# Patient Record
Sex: FEMALE | Race: BLACK OR AFRICAN AMERICAN | NOT HISPANIC OR LATINO
[De-identification: names, ages, dates, MRNs, and addresses within clinical notes are randomized per-mention and may not be internally consistent; named-entity substitution may affect disease eponyms.]

---

## 2019-06-07 ENCOUNTER — HOSPITAL (OUTPATIENT)
Dept: OTHER | Age: 24
End: 2019-06-07

## 2019-06-07 PROCEDURE — 99283 EMERGENCY DEPT VISIT LOW MDM: CPT | Performed by: EMERGENCY MEDICINE

## 2019-10-30 ENCOUNTER — OFFICE VISIT (OUTPATIENT)
Dept: INTERNAL MEDICINE CLINIC | Facility: CLINIC | Age: 24
End: 2019-10-30
Payer: MEDICAID

## 2019-10-30 VITALS
HEART RATE: 78 BPM | RESPIRATION RATE: 17 BRPM | DIASTOLIC BLOOD PRESSURE: 72 MMHG | OXYGEN SATURATION: 99 % | HEIGHT: 62 IN | WEIGHT: 239.19 LBS | TEMPERATURE: 99 F | SYSTOLIC BLOOD PRESSURE: 120 MMHG | BODY MASS INDEX: 44.01 KG/M2

## 2019-10-30 DIAGNOSIS — R63.5 ABNORMAL WEIGHT GAIN: Primary | ICD-10-CM

## 2019-10-30 DIAGNOSIS — D64.9 ANEMIA, UNSPECIFIED TYPE: ICD-10-CM

## 2019-10-30 DIAGNOSIS — E66.01 MORBID OBESITY (HCC): ICD-10-CM

## 2019-10-30 DIAGNOSIS — Z51.81 THERAPEUTIC DRUG MONITORING: ICD-10-CM

## 2019-10-30 PROCEDURE — 82607 VITAMIN B-12: CPT | Performed by: INTERNAL MEDICINE

## 2019-10-30 PROCEDURE — 84443 ASSAY THYROID STIM HORMONE: CPT | Performed by: INTERNAL MEDICINE

## 2019-10-30 PROCEDURE — 83540 ASSAY OF IRON: CPT | Performed by: INTERNAL MEDICINE

## 2019-10-30 PROCEDURE — 82728 ASSAY OF FERRITIN: CPT | Performed by: INTERNAL MEDICINE

## 2019-10-30 PROCEDURE — 82306 VITAMIN D 25 HYDROXY: CPT | Performed by: INTERNAL MEDICINE

## 2019-10-30 PROCEDURE — 82746 ASSAY OF FOLIC ACID SERUM: CPT | Performed by: INTERNAL MEDICINE

## 2019-10-30 PROCEDURE — 83036 HEMOGLOBIN GLYCOSYLATED A1C: CPT | Performed by: INTERNAL MEDICINE

## 2019-10-30 PROCEDURE — 80053 COMPREHEN METABOLIC PANEL: CPT | Performed by: INTERNAL MEDICINE

## 2019-10-30 PROCEDURE — 85025 COMPLETE CBC W/AUTO DIFF WBC: CPT | Performed by: INTERNAL MEDICINE

## 2019-10-30 PROCEDURE — 84466 ASSAY OF TRANSFERRIN: CPT | Performed by: INTERNAL MEDICINE

## 2019-10-30 PROCEDURE — 99204 OFFICE O/P NEW MOD 45 MIN: CPT | Performed by: INTERNAL MEDICINE

## 2019-10-30 PROCEDURE — 93000 ELECTROCARDIOGRAM COMPLETE: CPT | Performed by: INTERNAL MEDICINE

## 2019-10-30 RX ORDER — PHENTERMINE HYDROCHLORIDE 15 MG/1
15 CAPSULE ORAL EVERY MORNING
Qty: 30 CAPSULE | Refills: 0 | Status: SHIPPED | OUTPATIENT
Start: 2019-10-30

## 2019-10-30 NOTE — PATIENT INSTRUCTIONS
Maggie Diallo    Welcome to [facility]. We are excited that you are committed to improving your health and have invited our practice to be part of your journey.  Our approach to the medical management of weight loss is similar to that of other water per day, add fiber ( benefiber) to the water to increase fullness, overcome constipation    · Eat slowly    · Do not drink your calories ( no regular pop, juice, high calorie coffee drinks, limit alcohol) Also stay away from artificially sweetened be Calculus of gall bladder and bile duct with nonacute cholecystitis with obstruction    Hepatitis B

## 2019-10-30 NOTE — PROGRESS NOTES
Thi Rodriguez is a 25year old female. Chief complaint:  weight loss management and obesity related comorbidities    HPI:     Thi Rodriguez is a 25year old female new to our office today.   with PMH as listed below here for weight management     Weigh patient. REVIEW OF SYSTEMS:   A comprehensive 10 point review of systems was completed.   Pertinent positives and negatives noted in the HPI      PHYSICAL EXAM:   /72   Pulse 78   Temp 98.7 °F (37.1 °C) (Oral)   Resp 17   Ht 62\"   Wt 239 to involve cardio and weight resistant training  · Increase water intake  · Start probiotic  · Advised to check blood work CBC, CMP, TSH, vitamin B12, vitamin D, and hemoglobin A1c  · EKG is done with normal sinus rhythm rate is 68 no acute ST-T wave santamaria

## 2019-11-06 RX ORDER — MELATONIN
325
Qty: 270 TABLET | Refills: 0 | Status: SHIPPED | OUTPATIENT
Start: 2019-11-06 | End: 2020-02-04

## 2019-11-06 RX ORDER — ERGOCALCIFEROL 1.25 MG/1
50000 CAPSULE ORAL WEEKLY
Qty: 12 CAPSULE | Refills: 1 | Status: SHIPPED | OUTPATIENT
Start: 2019-11-06 | End: 2020-01-23

## 2019-11-07 ENCOUNTER — TELEPHONE (OUTPATIENT)
Dept: INTERNAL MEDICINE CLINIC | Facility: CLINIC | Age: 24
End: 2019-11-07

## 2020-01-08 ENCOUNTER — OFFICE VISIT (OUTPATIENT)
Dept: INTERNAL MEDICINE CLINIC | Facility: CLINIC | Age: 25
End: 2020-01-08
Payer: MEDICAID

## 2020-01-08 VITALS
HEIGHT: 62 IN | TEMPERATURE: 99 F | OXYGEN SATURATION: 98 % | WEIGHT: 229.63 LBS | HEART RATE: 72 BPM | RESPIRATION RATE: 17 BRPM | DIASTOLIC BLOOD PRESSURE: 82 MMHG | BODY MASS INDEX: 42.26 KG/M2 | SYSTOLIC BLOOD PRESSURE: 102 MMHG

## 2020-01-08 DIAGNOSIS — E61.1 IRON DEFICIENCY: ICD-10-CM

## 2020-01-08 DIAGNOSIS — E66.01 MORBID OBESITY (HCC): ICD-10-CM

## 2020-01-08 DIAGNOSIS — Z51.81 THERAPEUTIC DRUG MONITORING: Primary | ICD-10-CM

## 2020-01-08 DIAGNOSIS — E55.9 VITAMIN D DEFICIENCY: ICD-10-CM

## 2020-01-08 PROCEDURE — 99214 OFFICE O/P EST MOD 30 MIN: CPT | Performed by: INTERNAL MEDICINE

## 2020-01-08 RX ORDER — PHENTERMINE HYDROCHLORIDE 30 MG/1
30 CAPSULE ORAL EVERY MORNING
Qty: 30 CAPSULE | Refills: 0 | Status: SHIPPED | OUTPATIENT
Start: 2020-01-08 | End: 2020-02-07

## 2020-01-08 NOTE — PROGRESS NOTES
Lola Davila is a 25year old female.     Chief complaint:weight loss follow up , medication refill, therapeutic drug monitoring    HPI:   Sarah Han here for follow up on weight loss   Wt Readings from Last 12 Encounters:  01/08/20 : 229 lb 9.6 oz (104.1 k monitoring     Abnormal weight gain     Morbid obesity (HCC)              REVIEW OF SYSTEMS:   A comprehensive 10 point review of systems was completed.   Pertinent positives and negatives noted in the the HPI          PHYSICAL EXAM:   /82   Pulse 72 intake  2. Labs ordered:    No   3. Hidden CHO/carbohydrate sources  4. Alcohol as possible source of hidden/amnesia calories and its effects  5. Importance of physical activity and reducing sedentary time  6.  Treatment plan       Please return to the clin

## 2020-01-09 PROBLEM — E55.9 VITAMIN D DEFICIENCY: Status: ACTIVE | Noted: 2020-01-09

## 2020-01-09 PROBLEM — E61.1 IRON DEFICIENCY: Status: ACTIVE | Noted: 2020-01-09
